# Patient Record
Sex: MALE | Race: WHITE | Employment: UNEMPLOYED | URBAN - METROPOLITAN AREA
[De-identification: names, ages, dates, MRNs, and addresses within clinical notes are randomized per-mention and may not be internally consistent; named-entity substitution may affect disease eponyms.]

---

## 2020-04-07 ENCOUNTER — HOSPITAL ENCOUNTER (EMERGENCY)
Age: 50
Discharge: HOME | End: 2020-04-07
Attending: EMERGENCY MEDICINE
Payer: SELF-PAY

## 2020-04-07 VITALS
OXYGEN SATURATION: 98 % | TEMPERATURE: 99.8 F | SYSTOLIC BLOOD PRESSURE: 135 MMHG | RESPIRATION RATE: 18 BRPM | HEIGHT: 71 IN | HEART RATE: 96 BPM | DIASTOLIC BLOOD PRESSURE: 76 MMHG | BODY MASS INDEX: 26.88 KG/M2 | WEIGHT: 192 LBS

## 2020-04-07 DIAGNOSIS — K04.7 DENTAL INFECTION: Primary | ICD-10-CM

## 2020-04-07 PROCEDURE — 99282 EMERGENCY DEPT VISIT SF MDM: CPT

## 2020-04-07 RX ORDER — PENICILLIN V POTASSIUM 500 MG/1
500 TABLET, FILM COATED ORAL 4 TIMES DAILY
Qty: 28 TAB | Refills: 0 | Status: SHIPPED | OUTPATIENT
Start: 2020-04-07 | End: 2020-04-14

## 2020-04-07 RX ORDER — NAPROXEN 500 MG/1
500 TABLET ORAL 2 TIMES DAILY WITH MEALS
Qty: 20 TAB | Refills: 0 | Status: SHIPPED | OUTPATIENT
Start: 2020-04-07

## 2020-04-07 NOTE — LETTER
[facility] EMERGENCY DEPT 
[address] [facility] [id number] [phone] Work/School Note Date: 4/7/2020 To Whom It May concern: 
 
Ximena Shahid was seen and treated today in the emergency room by the following provider(s): 
Attending Provider: Madeleine Bruno MD 
Physician Assistant: Elton Claude, PA. Ximena Shahid may return to work on 4/8/2020. Sincerely, ROSE Abebe

## 2020-04-07 NOTE — DISCHARGE INSTRUCTIONS
Patient Education     Dental Pain: After Your Visit  Your Care Instructions  The most common cause of dental pain is tooth decay. It can also be caused by an infection of the tooth (abscess) or gum, a tooth that has not broken all the way through the gum (impacted tooth), or a problem with the nerve-filled center of the tooth. Follow-up care is a key part of your treatment and safety. Be sure to make and go to all appointments, and call your doctor if you are having problems. Its also a good idea to know your test results and keep a list of the medicines you take. How can you care for yourself at home? · Contact a dentist for follow-up care. · Put ice or a cold pack on the outside of your mouth for 10 to 20 minutes at a time to reduce pain and swelling. Put a thin cloth between the ice and your skin. · Take an over-the-counter pain medicine, such as acetaminophen (Tylenol), ibuprofen (Advil, Motrin), or naproxen (Aleve). Read and follow all instructions on the label. · Do not take two or more pain medicines at the same time unless the doctor told you to. Many pain medicines have acetaminophen, which is Tylenol. Too much acetaminophen (Tylenol) can be harmful. · Rinse your mouth with warm salt water every 2 hours to help relieve pain and swelling from an infected tooth. Mix 1 teaspoon of salt in 8 ounces of water. · If your doctor prescribed antibiotics, take them as directed. Do not stop taking them just because you feel better. You need to take the full course of antibiotics. When should you call for help? Call your doctor now or seek immediate medical care if:  · You have signs of infection, such as:  ¨ Increased pain, swelling, warmth, or redness. ¨ Pus draining from the gum, tooth, or face. ¨ A fever. Watch closely for changes in your health, and be sure to contact your doctor if:  · You do not get better as expected. Where can you learn more?    Go to [employer]  Enter V264 in the search box to learn more about \"Dental Pain: After Your Visit. \"   © 9599-3089 [employer]. Care instructions adapted under license by [facility] (which disclaims liability or warranty for this information). This care instruction is for use with your licensed healthcare professional. If you have questions about a medical condition or this instruction, always ask your healthcare professional. [address] any warranty or liability for your use of this information. Content Version: 41.4.597775;  Last Revised: May 17, 2013

## 2020-04-07 NOTE — ED TRIAGE NOTES
Pt presents with sinus congestion/drainage x 2 weeks, sore throat and left upper dental pain x 2 days. States bough Chloraseptic spray today for throat.

## 2020-04-07 NOTE — ED PROVIDER NOTES
EMERGENCY DEPARTMENT HISTORY AND PHYSICAL EXAM      Date: 4/7/2020  Patient Name: Chaz Root    History of Presenting Illness     Chief Complaint   Patient presents with    Sore Throat    Nasal Congestion    Dental Pain       History Provided By: Patient    HPI: Chaz Root, 48 y.o. male no significant PMHx presents ambulatory to the ED with cc of dental pain \"all over\", worse to left upper gum. Pt reports dental pain x \"months, worse over the past week\". Denies fever/chills. Pt reports drainage at times. Denies known trauma or injury. Pt has not f/u with dentist. Rates pain 5/10. Pt has not taken anything for sx. Pt also reports assoc lymphadenopathy, \"which is how I know it's getting worse\". Also reports assoc congestion x 2 weeks. Denies cough, SOB, or known exposure to COVID. There are no other complaints, changes, or physical findings at this time. PCP: None    No current facility-administered medications on file prior to encounter. No current outpatient medications on file prior to encounter. Past History     Past Medical History:  History reviewed. No pertinent past medical history. Past Surgical History:  History reviewed. No pertinent surgical history. Family History:  History reviewed. No pertinent family history. Social History:  Social History     Tobacco Use    Smoking status: Not on file   Substance Use Topics    Alcohol use: Not on file    Drug use: Not on file       Allergies: Allergies no known allergies      Review of Systems   Review of Systems   Constitutional: Negative for chills and fever. HENT: Positive for congestion and dental problem. Negative for facial swelling. Eyes: Negative for photophobia and visual disturbance. Respiratory: Negative for shortness of breath. Cardiovascular: Negative for chest pain. Gastrointestinal: Negative for abdominal pain, nausea and vomiting. Genitourinary: Negative for flank pain.    Skin: Negative for color change, pallor, rash and wound. Neurological: Negative for dizziness, weakness, light-headedness and headaches. Hematological: Positive for adenopathy. All other systems reviewed and are negative. Physical Exam   Physical Exam  Constitutional:       Comments: Pt well-appearing in NAD   HENT:      Ears:      Comments: TMs clear b/l     Nose:      Comments: No sinus tenderness     Mouth/Throat:        Comments: Missing multiple teeth  No tonsillar or pharyngeal erythema, swelling or exudate  No sublingual swelling    Lymphadenopathy:      Head:      Right side of head: Submandibular adenopathy present. No submental, tonsillar or preauricular adenopathy. Left side of head: Submandibular adenopathy present. No submental, tonsillar or preauricular adenopathy. Diagnostic Study Results     Labs -   No results found for this or any previous visit (from the past 12 hour(s)). Radiologic Studies -   No orders to display     CT Results  (Last 48 hours)    None        CXR Results  (Last 48 hours)    None          Medical Decision Making   I am the first provider for this patient. I reviewed the vital signs, available nursing notes, past medical history, past surgical history, family history and social history. Vital Signs-Reviewed the patient's vital signs. Patient Vitals for the past 12 hrs:   Temp Pulse Resp BP SpO2   04/07/20 1206     98 %   04/07/20 1202 99.8 °F (37.7 °C) 96 18 135/76 98 %       Records Reviewed: Nursing Notes and Old Medical Records    Provider Notes (Medical Decision Making):   DDx: Dental pain vs infection vs abscess, Sinusitis, Seasonal allergies    47 yo M with complaints of dental pain with assoc lymphadenopathy and congestion x 2 weeks. Denies cough, fever, SOB or known exposure with COVID. Poor dentition with surrounding gum swelling and assoc lymphadenopathy visualized on exam. No facial swelling.  Will treat with abx and stressed importance of prompt dental f/u. Pt non-toxic appearing, afebrile in NAD. Pt stable for prompt outpatient f/u with dentist.     ED Course:   Initial assessment performed. The patients presenting problems have been discussed, and they are in agreement with the care plan formulated and outlined with them. I have encouraged them to ask questions as they arise throughout their visit. Disposition:  12:26 PM  Discussed dx and treatment plan. Discussed importance of PCP follow up. All questions answered. Pt voiced they understood. Return if sx worsen. PLAN:  1. Current Discharge Medication List      START taking these medications    Details   penicillin v potassium (VEETID) 500 mg tablet Take 1 Tab by mouth four (4) times daily for 7 days. Qty: 28 Tab, Refills: 0      naproxen (NAPROSYN) 500 mg tablet Take 1 Tab by mouth two (2) times daily (with meals). Qty: 20 Tab, Refills: 0           2. Follow-up Information     Follow up With Specialties Details Why [address] [place] [address]  Schedule an appointment as soon as possible for a visit in 1 day  [address] [facility]  [phone]        Return to ED if worse     Diagnosis     Clinical Impression:   1. Dental infection        Attestations:    ROSE Guzman    Please note that this dictation was completed with Repeatit, the computer voice recognition software. Quite often unanticipated grammatical, syntax, homophones, and other interpretive errors are inadvertently transcribed by the computer software. Please disregard these errors. Please excuse any errors that have escaped final proofreading. Thank you.